# Patient Record
Sex: FEMALE | Race: WHITE | NOT HISPANIC OR LATINO | URBAN - METROPOLITAN AREA
[De-identification: names, ages, dates, MRNs, and addresses within clinical notes are randomized per-mention and may not be internally consistent; named-entity substitution may affect disease eponyms.]

---

## 2023-04-15 LAB
CREATININE (MG/DL) IN SER/PLAS: 0.92 MG/DL (ref 0.5–1.05)
GFR FEMALE: 74 ML/MIN/1.73M2
UREA NITROGEN (MG/DL) IN SER/PLAS: 22 MG/DL (ref 6–23)

## 2025-04-01 ENCOUNTER — OFFICE VISIT (OUTPATIENT)
Dept: ORTHOPEDIC SURGERY | Facility: CLINIC | Age: 55
End: 2025-04-01
Payer: COMMERCIAL

## 2025-04-01 ENCOUNTER — HOSPITAL ENCOUNTER (OUTPATIENT)
Dept: RADIOLOGY | Facility: CLINIC | Age: 55
Discharge: HOME | End: 2025-04-01
Payer: COMMERCIAL

## 2025-04-01 DIAGNOSIS — M25.562 LEFT KNEE PAIN, UNSPECIFIED CHRONICITY: Primary | ICD-10-CM

## 2025-04-01 DIAGNOSIS — M25.562 LEFT KNEE PAIN, UNSPECIFIED CHRONICITY: ICD-10-CM

## 2025-04-01 PROCEDURE — 99214 OFFICE O/P EST MOD 30 MIN: CPT | Performed by: ORTHOPAEDIC SURGERY

## 2025-04-01 PROCEDURE — 73562 X-RAY EXAM OF KNEE 3: CPT | Mod: LT

## 2025-04-01 PROCEDURE — 99204 OFFICE O/P NEW MOD 45 MIN: CPT | Performed by: ORTHOPAEDIC SURGERY

## 2025-04-01 NOTE — PROGRESS NOTES
New patient to me 54-year-old female here for her left knee.  She states she had bilateral knee replacements done elsewhere and her right knee has been great ever since the beginning but her left knee has never felt right since her surgery.  It is gotten progressively worse now and she is 2 years out from her bilateral knee replacements.  She states her operating surgeon has done a workup for her left knee and recently she had a bone scan which showed increased activity and they discussed the possibility of revision knee replacement on the left side she just wanted to get a second opinion.  Denies any postoperative complication with persistent wound drainage DVT blood clot or infection    Location of pain: She does have pain that radiates down into her ankle on the left side sometimes she feels unstable  Quality of pain: Some days it does not bother a whole lot but other days it is severe and it swells up she has episodes of instability and giving way  Modifying factors: Worse when she stands for prolonged period time better with rest  Associated signs and symptoms: Intermittent swelling some popping clicking and some instability in her ankle  Previous treatment: History of left knee replacement 2 years ago        The patient's past medical history, family history, social history, and review of systems were documented on the patient's medical intake form.  The medical intake form was reviewed and scanned into the electronic medical record for future use.  History is otherwise negative except as stated in the HPI.    Physical exam    General: Alert and oriented to place, person, and time.  No acute distress and breathing comfortably; pleasant and cooperative with the examination.  HEENT: Head is normocephalic and atraumatic.  Neck: Supple, no visible swelling.  Cardiovascular: Good perfusion to the affected extremity.  Lungs: No audible wheezing or labored breathing.  Abdomen: Nondistended  HEME/Lymph : No  visible abnormalities bilateral lower extremity    Extremity:  Left knee has well-healed midline incision full knee extension flexion 120 degrees there is mild instability varus valgus particularly in mid flexion brisk cap refill compartments are soft calf is nontender no signs of infection good range of motion of the hip and ankle    Diagnostics:  Bone scan which was performed [facility] is read individually reviewed by myself    Imaging  No results found.    Cardiology, Vascular, and Other Imaging  XR knee left 3 views    Result Date: 4/1/2025  Interpreted By:  Ning He, STUDY: XR KNEE LEFT 3 VIEWS; 4/1/2025 10:06 am   INDICATION: Signs/Symptoms:pain.   ACCESSION NUMBER(S): [id number]   ORDERING CLINICIAN: NING HE   FINDINGS: Left knee three views. Status post cemented total knee replacement some shadowing on the femoral side possible early loosening no signs of fracture dislocation or other bony abnormality     Signed by: Ning He 4/1/2025 10:34 AM Dictation workstation:   [id number]          Procedures  [none   ]    Assessment:  Loose total knee replacement left    Treatment plan:  1.  The natural history of the condition and its associated treatment alternatives including surgical and nonsurgical options were discussed with the patient at length.  2.  Patient with significant impairment of bodily function related to her left knee we discussed surgical nonsurgical option we had a long conversation today regarding risk factors for loosening postoperatively.  Most common identifiable cause for postoperative loosening would be infection and seems like infection has been ruled out.  Most common cause for loosening is idiopathic and in her case that seems to be her situation.  Possibly some shadowing on her plain films symptoms which are consistent with loosening and a hot bone scan tells me it is not likely to get better over time.  We discussed revision knee replacement its  risk benefits treatment alternatives and postoperative course she continues to consider her surgical and nonsurgical options.  3. [   ]  4.  All of the patient's questions were answered.    Orders Placed This Encounter    XR knee left 3 views       This note was prepared using voice recognition software.  The details of this note are correct and have been reviewed, and corrected to the best of my ability.  Some grammatical areas may persist related to the Dragon software    Noel He MD  Senior Attending Physician  [facility]  [facility] [place]    [phone]